# Patient Record
(demographics unavailable — no encounter records)

---

## 2025-07-09 NOTE — ASSESSMENT
[FreeTextEntry1] : 17 yo F here for eating disorder evaluation.  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 Discussed risks of dangerous weight loss behaviors which include and are not limited to: GI effects (JAMES, GERD, esophageal dysmotility, abdominal pain and bloating, constipation, ileus, IBS, etc.), Renal and Electrolyte abnormalities (dehydration, hypokalemia, hypochloremia, hyponatremia, metabolic alkalosis etc.), Cardiovascular (hypotension, sinus tachycardia, palpitations, edema, life threatening arrhythmias etc.), Endocrine effects on bone health and reproductive abnormalities, Hormonal effects etc.    #protein calorie malnutrition/weight loss/nutritional deficiency -reviewed nutritional deficiencies -nutritional counseling provided -RD reviewed recalls and made specific recommendations to normalize eating patterns.  -we discussed prognosis and when patients qualify for higher level of care options.   #anxiety -rec psychotherapy at this time -will consider psychiatry evaluation and med mgt at future visits   RTC q2-3 weeks

## 2025-07-09 NOTE — HISTORY OF PRESENT ILLNESS
[Fear of Gaining Weight] : has fear of gaining weight [Purging ___] : no purging [Laxatives] : no laxatives [de-identified] : 16 year yo F here for initial evaluation of eating disorder.  Patient referred by PCP for r/o ED.   When Highschool started, didnt like her grades. Abrupt onset of food changes 2-3 months ago. Reports it was because of increasing school work.  No sleep issues.   Reports she started realizing she was gaining more of a stomach and she started being more restrictive with what she was taking in. Reports she was concerned about getting diabetes like her Mom. Per Mom;   ROS: Patient reports no hx of syncope, dizziness, headaches, no blurred or decreased vision, no nocturnal enuresis, no abdominal pain, no joint pain. No shortness of breath, no chest pain. No extremity swelling.  Home: Lives w Mom Dad and brother, growing up at home told not to get fat and not to gain weight. Brother used to tell her that she looks like a pig and is fat. Has been going on for years. Would cry about her body.  Education: rising 11th grade  Activities: Volleyball Team High Risk Behaviors: No drug/acl/smoking.  Cis, Eyal - Mom aware. Understanding.   Psychosocial/Depression Eval: See PHQ/Isaias scores, minimal.  Reports does not want to eat. Thought she didnt have time. But now that school is out, knows she doesnt eat that much. Reports making that choice not to eat. Reports not eating because she does not want to gain weight.  [de-identified] : 156 [de-identified] : May 2025 [de-identified] : 147.5 [de-identified] : Current [de-identified] : 147.5 [de-identified] : 120-130 [de-identified] : 11 [de-identified] : 6/20/2025